# Patient Record
Sex: FEMALE | Race: OTHER | HISPANIC OR LATINO | URBAN - METROPOLITAN AREA
[De-identification: names, ages, dates, MRNs, and addresses within clinical notes are randomized per-mention and may not be internally consistent; named-entity substitution may affect disease eponyms.]

---

## 2017-11-12 ENCOUNTER — EMERGENCY (EMERGENCY)
Facility: HOSPITAL | Age: 22
LOS: 1 days | Discharge: HOME | End: 2017-11-12
Attending: EMERGENCY MEDICINE | Admitting: EMERGENCY MEDICINE
Payer: SELF-PAY

## 2017-11-12 VITALS
OXYGEN SATURATION: 100 % | HEART RATE: 90 BPM | TEMPERATURE: 98 F | SYSTOLIC BLOOD PRESSURE: 123 MMHG | RESPIRATION RATE: 16 BRPM | DIASTOLIC BLOOD PRESSURE: 80 MMHG

## 2017-11-12 LAB
APPEARANCE UR: SIGNIFICANT CHANGE UP
BACTERIA # UR AUTO: SIGNIFICANT CHANGE UP
BILIRUB UR-MCNC: NEGATIVE — SIGNIFICANT CHANGE UP
BLOOD UR QL VISUAL: NEGATIVE — SIGNIFICANT CHANGE UP
COLOR SPEC: SIGNIFICANT CHANGE UP
GLUCOSE UR-MCNC: NEGATIVE — SIGNIFICANT CHANGE UP
KETONES UR-MCNC: NEGATIVE — SIGNIFICANT CHANGE UP
LEUKOCYTE ESTERASE UR-ACNC: HIGH
MUCOUS THREADS # UR AUTO: SIGNIFICANT CHANGE UP
NITRITE UR-MCNC: NEGATIVE — SIGNIFICANT CHANGE UP
PH UR: 7 — SIGNIFICANT CHANGE UP (ref 4.6–8)
PROT UR-MCNC: 10 — SIGNIFICANT CHANGE UP
RBC CASTS # UR COMP ASSIST: SIGNIFICANT CHANGE UP (ref 0–?)
SP GR SPEC: 1.02 — SIGNIFICANT CHANGE UP (ref 1–1.03)
SQUAMOUS # UR AUTO: SIGNIFICANT CHANGE UP
UROBILINOGEN FLD QL: NORMAL E.U. — SIGNIFICANT CHANGE UP (ref 0.1–0.2)
WBC UR QL: SIGNIFICANT CHANGE UP (ref 0–?)

## 2017-11-12 PROCEDURE — 99285 EMERGENCY DEPT VISIT HI MDM: CPT

## 2017-11-12 RX ORDER — CIPROFLOXACIN LACTATE 400MG/40ML
500 VIAL (ML) INTRAVENOUS ONCE
Qty: 0 | Refills: 0 | Status: DISCONTINUED | OUTPATIENT
Start: 2017-11-12 | End: 2017-11-12

## 2017-11-12 RX ORDER — KETOROLAC TROMETHAMINE 30 MG/ML
30 SYRINGE (ML) INJECTION ONCE
Qty: 0 | Refills: 0 | Status: DISCONTINUED | OUTPATIENT
Start: 2017-11-12 | End: 2017-11-12

## 2017-11-12 RX ORDER — DIAZEPAM 5 MG
1 TABLET ORAL
Qty: 4 | Refills: 0 | OUTPATIENT
Start: 2017-11-12 | End: 2017-11-13

## 2017-11-12 RX ADMIN — Medication 30 MILLIGRAM(S): at 16:21

## 2017-11-12 NOTE — ED PROVIDER NOTE - CARE PLAN
Principal Discharge DX:	Back pain  Instructions for follow-up, activity and diet:	Follow up with your medical doctor in 2 days for re-evaluation.  Recommend following up with orthopedic spine doctor (see referral list); call for appointment.  Medications:  ibuprofen 600 mg: Take one tablet every 6 hours as needed for pain / inflammation (take with food).  diazepam 5 mg:  Take one tablet every 6 hours for muscle relaxation (may make you drowsy; do not drive on this medication).  Apply  Rest / gentle SYMMETRICAL body movements / light activity.  Minimize bending / carrying / lifting.  Return to the Emergency Department if any new or worsening symptoms or for any concerns / issues / problems. Principal Discharge DX:	Back pain  Instructions for follow-up, activity and diet:	Follow up with your medical doctor in 2 days for re-evaluation.  Recommend following up with orthopedic spine doctor (see referral list); call for appointment.  Medications:  ibuprofen 600 mg: Take one tablet every 6 hours as needed for pain / inflammation (take with food).  diazepam 5 mg:  Take one tablet every 6 hours for muscle relaxation (may make you drowsy; do not drive on this medication).  Apply Arnica gel 4 times as needed for muscle pain.  Rest / gentle SYMMETRICAL body movements / light activity.  Minimize bending / carrying / lifting.  Return to the Emergency Department if any new or worsening symptoms or for any concerns / issues / problems.

## 2017-11-12 NOTE — ED ADULT TRIAGE NOTE - CHIEF COMPLAINT QUOTE
Pt presents with c/o atraumatic lower back pain x 3 days. Describes the pain as a tightness. Increased pain with movement. Denies any other symptoms with the exception of pain. LMP: "last month".

## 2017-11-12 NOTE — ED PROVIDER NOTE - OBJECTIVE STATEMENT
23 y/o F w/ no significant PMhx presents to ED c/o worsening back pain x2days. Pt reports not mechanisms of injury. Works as a . Having difficulty moving secondary to pain. Denies incontinence, fever, chills, numbness, tingling, and no other complaints. Took an anti-inflammatory med x2days ago which made her feel lightheaded.

## 2017-11-12 NOTE — ED PROVIDER NOTE - PLAN OF CARE
Follow up with your medical doctor in 2 days for re-evaluation.  Recommend following up with orthopedic spine doctor (see referral list); call for appointment.  Medications:  ibuprofen 600 mg: Take one tablet every 6 hours as needed for pain / inflammation (take with food).  diazepam 5 mg:  Take one tablet every 6 hours for muscle relaxation (may make you drowsy; do not drive on this medication).  Apply  Rest / gentle SYMMETRICAL body movements / light activity.  Minimize bending / carrying / lifting.  Return to the Emergency Department if any new or worsening symptoms or for any concerns / issues / problems. Follow up with your medical doctor in 2 days for re-evaluation.  Recommend following up with orthopedic spine doctor (see referral list); call for appointment.  Medications:  ibuprofen 600 mg: Take one tablet every 6 hours as needed for pain / inflammation (take with food).  diazepam 5 mg:  Take one tablet every 6 hours for muscle relaxation (may make you drowsy; do not drive on this medication).  Apply Arnica gel 4 times as needed for muscle pain.  Rest / gentle SYMMETRICAL body movements / light activity.  Minimize bending / carrying / lifting.  Return to the Emergency Department if any new or worsening symptoms or for any concerns / issues / problems.

## 2017-11-12 NOTE — ED PROVIDER NOTE - MUSCULOSKELETAL MINIMAL EXAM
muscle tenderness to BL lower back, spinal exam TENDERNESS/muscle tenderness to BL lower back, spinal exam

## 2017-11-14 LAB
BACTERIA UR CULT: SIGNIFICANT CHANGE UP
SPECIMEN SOURCE: SIGNIFICANT CHANGE UP

## 2018-06-14 ENCOUNTER — TRANSCRIPTION ENCOUNTER (OUTPATIENT)
Age: 23
End: 2018-06-14

## 2018-10-29 ENCOUNTER — EMERGENCY (EMERGENCY)
Facility: HOSPITAL | Age: 23
LOS: 1 days | Discharge: HOME | End: 2018-10-29
Attending: EMERGENCY MEDICINE
Payer: SELF-PAY

## 2018-10-29 VITALS
RESPIRATION RATE: 20 BRPM | DIASTOLIC BLOOD PRESSURE: 81 MMHG | HEIGHT: 62 IN | OXYGEN SATURATION: 97 % | WEIGHT: 143.96 LBS | TEMPERATURE: 98 F | SYSTOLIC BLOOD PRESSURE: 120 MMHG | HEART RATE: 103 BPM

## 2018-10-29 LAB
APPEARANCE UR: CLEAR — SIGNIFICANT CHANGE UP
BACTERIA # UR AUTO: ABNORMAL
BILIRUB UR-MCNC: NEGATIVE — SIGNIFICANT CHANGE UP
COLOR SPEC: SIGNIFICANT CHANGE UP
DIFF PNL FLD: NEGATIVE — SIGNIFICANT CHANGE UP
EPI CELLS # UR: 4 /HPF — SIGNIFICANT CHANGE UP
GLUCOSE UR QL: NEGATIVE — SIGNIFICANT CHANGE UP
HYALINE CASTS # UR AUTO: 0 /LPF — SIGNIFICANT CHANGE UP (ref 0–2)
KETONES UR-MCNC: NEGATIVE — SIGNIFICANT CHANGE UP
LEUKOCYTE ESTERASE UR-ACNC: ABNORMAL
NITRITE UR-MCNC: NEGATIVE — SIGNIFICANT CHANGE UP
PH UR: 7 — SIGNIFICANT CHANGE UP (ref 5–8)
PROT UR-MCNC: ABNORMAL
RBC CASTS # UR COMP ASSIST: 3 /HPF — SIGNIFICANT CHANGE UP (ref 0–4)
SP GR SPEC: 1.03 — HIGH (ref 1.01–1.02)
UROBILINOGEN FLD QL: NEGATIVE — SIGNIFICANT CHANGE UP
WBC UR QL: 32 /HPF — HIGH (ref 0–5)

## 2018-10-29 PROCEDURE — 99284 EMERGENCY DEPT VISIT MOD MDM: CPT

## 2018-10-29 PROCEDURE — 81001 URINALYSIS AUTO W/SCOPE: CPT

## 2018-10-29 PROCEDURE — 87186 SC STD MICRODIL/AGAR DIL: CPT

## 2018-10-29 PROCEDURE — 87102 FUNGUS ISOLATION CULTURE: CPT

## 2018-10-29 PROCEDURE — 87086 URINE CULTURE/COLONY COUNT: CPT

## 2018-10-29 RX ORDER — FLUCONAZOLE 150 MG/1
1 TABLET ORAL
Qty: 3 | Refills: 0 | OUTPATIENT
Start: 2018-10-29

## 2018-10-29 RX ORDER — CEPHALEXIN 500 MG
1 CAPSULE ORAL
Qty: 10 | Refills: 0 | OUTPATIENT
Start: 2018-10-29 | End: 2018-11-02

## 2018-10-29 NOTE — ED PROVIDER NOTE - ATTENDING CONTRIBUTION TO CARE
Attending MD Zhu: I personally have seen and examined this patient.  Resident note reviewed and agree on plan of care and except where noted.  See below for details.     Seen in FT 4    23F with PMH of recurrent yeast infections presents to the ED with vaginal itching and suspicion for a yeast infection.  Reports that for two years has been having intermittent yeast infections that have needed both po and intra-vaginal treatment for resolution.  Reports has seen a number of Gyns to try to understand why she is getting these infections but reports etiology is still unclear.  Reports of late she has been seeing Dr Bj Parker but was unable to get an appointment to see him.  Reports that she is having vaginal itch over the last week, worsening.  Reports feels similar to her previous yeast infections.  Reports typically needs both Diflucan and Clotrimazole to resolve infection. Denies dysuria, hematuria, change in urinary habits including frequency, urgency. Denies chest pain, shortness of breath, palpitations. Denies abdominal pain, nausea, vomiting, diarrhea, blood in stools. Denies fevers, chills.  Reports sexually active with one male partner.  Denies history of STI.  On exam, NAD, head NCAT, PERRL, FROM at neck, minimal erythema to labia, no genital lesions, no tenderness to midline palpation, no stepoffs along length of spine, lungs CTAB with good inspiratory effort, +S1S2, no m/r/g, abdomen soft with +BS, NT, ND, no CVAT, pelvic exam Dr. Dawn chaperoned, +thick white discharge, no CMT, no adnexal tenderness, no blood in vaginal vault, moving all extremities with 5/5 strength bilateral upper and lower extremities, good and equal  strength bilaterally; A/P: 23F with vaginal itching and recurrent yeast infections, given repeated episodes, will giv Diflucan and Terconazole, will send UA, UrCx, UrHCG

## 2018-10-29 NOTE — ED ADULT NURSE NOTE - OBJECTIVE STATEMENT
23 year old female A&OX4 presents with vaginal discomfort x 1 week. Patient states that she has had hx of yeast infections in the past and that this feels similar. Patient endorses yellow vaginal discharge. Patient denies abdominal pain. Patient's abdomen is soft and non tender to palpation. Patient denies nausea, vomiting, dizziness, weakness, fevers, chills, burning or difficulty urinating.

## 2018-10-29 NOTE — ED PROVIDER NOTE - PROGRESS NOTE DETAILS
Attending MD Zhu: Patient re-evaluated and lab tests reviewed with patient.  Explained Rx Keflex (for UTI) and others for vaginal candidiasis.  Patient stable for discharge. Follow up instructions given, importance of follow up with Gyn emphasized, return to ED parameters reviewed and patient verbalized understanding.  All questions answered, all concerns addressed.

## 2018-10-29 NOTE — ED ADULT TRIAGE NOTE - CHIEF COMPLAINT QUOTE
"I have a yeast infection", "I get them frequently". Patient states she has "yellowish" vaginal discharge and itching.

## 2018-10-29 NOTE — ED PROVIDER NOTE - MEDICAL DECISION MAKING DETAILS
well appearing 22 yo F w/ recurrent yeast infections p/w CC vaginal itching likely 2/2 to yeast infection will send fungal swab and treat with diflucan and clotrimazole

## 2018-10-29 NOTE — ED PROVIDER NOTE - OBJECTIVE STATEMENT
22yo F pmhx recurrent yeast infections p/w CC vaginal itching x1 week. Pt. explains that she has been unable to see her GYN because he is booked for the next few weeks, she states that current symptoms feel like her usual yeast infection for which she usually is prescribed diflucan and clotrimazole cream. Pt. is sexually active with 1 partner. She denies fever chills cp sob n/v/d.

## 2018-10-29 NOTE — ED ADULT NURSE NOTE - NSIMPLEMENTINTERV_GEN_ALL_ED
Implemented All Universal Safety Interventions:  Wing to call system. Call bell, personal items and telephone within reach. Instruct patient to call for assistance. Room bathroom lighting operational. Non-slip footwear when patient is off stretcher. Physically safe environment: no spills, clutter or unnecessary equipment. Stretcher in lowest position, wheels locked, appropriate side rails in place.

## 2018-10-31 NOTE — ED POST DISCHARGE NOTE - RESULT SUMMARY
Prelim ucx results + >100,000 CFU/mL Klebsiella pneumoniae, final results/sensitivities pending Prelim ucx results + >100,000 CFU/mL Klebsiella pneumoniae, final results/sensitivities pending; genital culture testing in progress Prelim ucx results + >100,000 CFU/mL Klebsiella pneumoniae, final results/sensitivities pending; fungal genital culture testing in progress

## 2018-10-31 NOTE — ED POST DISCHARGE NOTE - OTHER COMMUNICATION
10/31/18: prelim ucx + >100,000 Klebsiella pneumoniae, sensitivities pending. Pt discharged on Keflex. Will await final sensitivities to determine need for contact vs appropriate care given. - Wiliam Martinez PA-C 10/31/18: prelim ucx + >100,000 Klebsiella pneumoniae, sensitivities pending. Pt discharged on Keflex. Additionally prelim fungal cx testing shows testing still in progress. Will await final sensitivities to determine need for contact vs appropriate care given. - Wiliam Martinez PA-C 10/31/18: prelim ucx + >100,000 Klebsiella pneumoniae, sensitivities pending. Pt discharged on Keflex. Additionally prelim fungal genital cx testing shows testing still in progress. Will await final sensitivities to determine need for contact vs appropriate care given. - Wiliam Martinez PA-C

## 2018-11-01 LAB
CULTURE RESULTS: SIGNIFICANT CHANGE UP
SPECIMEN SOURCE: SIGNIFICANT CHANGE UP

## 2019-01-10 ENCOUNTER — EMERGENCY (EMERGENCY)
Facility: HOSPITAL | Age: 24
LOS: 1 days | Discharge: HOME | End: 2019-01-10
Attending: EMERGENCY MEDICINE
Payer: COMMERCIAL

## 2019-01-10 ENCOUNTER — TRANSCRIPTION ENCOUNTER (OUTPATIENT)
Age: 24
End: 2019-01-10

## 2019-01-10 VITALS
TEMPERATURE: 98 F | OXYGEN SATURATION: 100 % | SYSTOLIC BLOOD PRESSURE: 113 MMHG | HEART RATE: 79 BPM | WEIGHT: 143.96 LBS | RESPIRATION RATE: 20 BRPM | DIASTOLIC BLOOD PRESSURE: 75 MMHG

## 2019-01-10 PROCEDURE — 93971 EXTREMITY STUDY: CPT | Mod: 26

## 2019-01-10 PROCEDURE — 93971 EXTREMITY STUDY: CPT

## 2019-01-10 PROCEDURE — 73562 X-RAY EXAM OF KNEE 3: CPT | Mod: 26,RT

## 2019-01-10 PROCEDURE — 73564 X-RAY EXAM KNEE 4 OR MORE: CPT | Mod: 26,RT

## 2019-01-10 PROCEDURE — 99284 EMERGENCY DEPT VISIT MOD MDM: CPT

## 2019-01-10 PROCEDURE — 99284 EMERGENCY DEPT VISIT MOD MDM: CPT | Mod: 25

## 2019-01-10 PROCEDURE — 73564 X-RAY EXAM KNEE 4 OR MORE: CPT

## 2019-01-10 RX ORDER — IBUPROFEN 200 MG
600 TABLET ORAL ONCE
Qty: 0 | Refills: 0 | Status: COMPLETED | OUTPATIENT
Start: 2019-01-10 | End: 2019-01-10

## 2019-01-10 RX ORDER — ACETAMINOPHEN 500 MG
650 TABLET ORAL ONCE
Qty: 0 | Refills: 0 | Status: COMPLETED | OUTPATIENT
Start: 2019-01-10 | End: 2019-01-10

## 2019-01-10 RX ADMIN — Medication 600 MILLIGRAM(S): at 12:29

## 2019-01-10 RX ADMIN — Medication 650 MILLIGRAM(S): at 12:29

## 2019-01-10 NOTE — ED ADULT NURSE NOTE - OBJECTIVE STATEMENT
24 y/o female with no other pmhx c/o pain and stiffness suddenly worsening over the past 3 days.  pt denies any recent long trips.  no vascular compromise noted.  pt is awake, alert and responsive to all stimuli.  no sob or respiratory distress.  pt awaiting md jimenez.  will continue to monitor.

## 2019-01-10 NOTE — ED PROCEDURE NOTE - PROCEDURE ADDITIONAL DETAILS
POCUS: Emergency Department Focused Ultrasound performed at patient's bedside.  The complete report will be available in PACS.   No evidence of proximal DVT.

## 2019-01-10 NOTE — ED PROVIDER NOTE - PROGRESS NOTE DETAILS
Pt feeling much better. Applied ACE wrap to good symptom relief. Told pt she could f/u with her PCP for repeat US within 1 wk. Attending note (Aram): Patient reassessed, reports improvement in pain, able to stand and walk without apparent difficulty.  US shows no evidence of proximal DVT; d/w patient importance of f/u and repeat US examination, to f/u with pcp.  Xray shows no evidence of fracture/dislocation; recommend f/u with ortho as outpatient.  Results of ED evaluation including US and Xray d/w patient, who verbalizes understanding of ED evaluation and discharge plan including medications, follow-up instructions and return precautions.

## 2019-01-10 NOTE — ED PROVIDER NOTE - NSFOLLOWUPINSTRUCTIONS_ED_ALL_ED_FT
Follow up with your PCP in 24-48 hours.   May take Tylenol and Motrin as directed on the bottle for pain control.   Return to the ER if you develop any new or worsening symptoms such as swelling, redness, chest pain, SOB, numbness, weakness, nausea, vomiting, or visual changes.

## 2019-01-10 NOTE — ED PROVIDER NOTE - NS ED ROS FT
GENERAL: No fever or chills  EYES: no change in vision  HEENT: no trouble swallowing or speaking  CARDIAC: no chest pain  PULMONARY: no cough or SOB  GI: no abdominal pain, no nausea, no vomiting, no diarrhea or constipation  : No changes in urination  SKIN: no rashes or lacerations  NEURO: no headache, numbness, tingling, weakness  MSK: +R knee pain and stiffness    ~Mohit Madison PGY1 GENERAL: No fever or chills  EYES: no change in vision  HEENT: no trouble swallowing or speaking  CARDIAC: no chest pain  PULMONARY: no cough or SOB  GI: no abdominal pain, no nausea, no vomiting, no diarrhea or constipation  : No changes in urination  SKIN: no rashes or lacerations  NEURO: no headache, numbness, tingling, weakness  MSK: +R knee pain and stiffness    ~Mohit Madison PGY1    Attending note (Aram): my review of systems is documented below:

## 2019-01-10 NOTE — ED PROVIDER NOTE - PHYSICAL EXAMINATION
Gen: AAOx3, non-toxic  Head: NCAT  HEENT: EOMI, oral mucosa moist, normal conjunctiva  Lung: CTAB, no respiratory distress, no wheezes/rhonchi/rales B/L, speaking in full sentences  CV: RRR, no murmurs, rubs or gallops  Abd: soft, NTND, no guarding, no CVA tenderness  MSK: full ROM of both knees. no visible deformities  Neuro: No focal sensory or motor deficits  Skin: Warm, well perfused, no rash, no significant swelling in the affected knee  Psych: normal affect.     ~Mohit Madison PGY1 Gen: AAOx3, non-toxic  Head: NCAT  HEENT: EOMI, oral mucosa moist, normal conjunctiva  Lung: CTAB, no respiratory distress, no wheezes/rhonchi/rales B/L, speaking in full sentences  CV: RRR, no murmurs, rubs or gallops  Abd: soft, NTND, no guarding, no CVA tenderness  MSK: full ROM of both knees. no visible deformities  Neuro: No focal sensory or motor deficits  Skin: Warm, well perfused, no rash, no significant swelling in the affected knee  Psych: normal affect.     ~Mohit Madison PGY1    Attending note (Aram): my physical exam findings are documented below:

## 2019-01-10 NOTE — ED PROVIDER NOTE - ATTENDING CONTRIBUTION TO CARE
24 y/o F  with no significant medical comorbidities c/o right knee stiffness and pain for 3 days; denies fall or other injury/trauma to the knee. States her R knee began having stiffness upon waking 3 days ago and also developed sharp pain behind the knee on bearing weight.  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  No recent immobilization/trauma/surgery, no known malignancy, no history of DVT/PE.    On Physical Exam:  General: well appearing, in NAD, speaking clearly in full sentences and without difficulty; cooperative with exam  Skin: intact, no rash; right knee has no overlying skin changes.  Extremities: no peripheral edema, no gross deformities; RLE: knee without deformity or swelling.  No point tenderness, no palpable effusion, warmth, fluctuance or crepitance.  Able to fully range joint actively.  No overlying skin changes.  Neuro: no gross neurologic deficits; sensation in right lower extremity grossly intact.  CV: dp and pt pulses intact in RLE.    AP: Atraumatic R knee pain without noted deformity or signs of infection, no palpable effusion.  Will obtain Xray to screen for bony injuries or lesions and obtain US to eval for DVT.  Ibuprofen for pain control.  If no acute findings on imaging, can be discharged with continued evaluation/management as outpatient.

## 2019-01-10 NOTE — ED PROVIDER NOTE - OBJECTIVE STATEMENT
23F no PMHx p/w atraumatic R knee stiffness and pain x 3 days. States her R knee was stiff after waking up 3 days ago. Since then she has also developed sharp pain behind the knee on bearing weight. Reports associated swelling in the posterior knee. Denies any chest pain, SOB, numbness, tingling, weakness, N/V/D, abdominal pain. Denies any recent immobilization/truama/surgery, known malignancy, history of DVT/PE.

## 2019-01-10 NOTE — ED PROVIDER NOTE - MUSCULOSKELETAL, MLM
Spine appears normal, range of motion is not limited, no muscle or joint tenderness; RLE: knee without deformity or swelling.  No point tenderness, no palpable effusion, warmth, fluctuance or crepitance.  Able to fully range joint actively.  No overlying skin changes.

## 2019-01-10 NOTE — ED PROVIDER NOTE - MEDICAL DECISION MAKING DETAILS
Atruamatic knee pain. Will r/o fracture and DVT with x-ray and US. Pain control and reassess. Atruamatic knee pain. Will r/o fracture and DVT with x-ray and US. Pain control and reassess.    Attending note (Aram): the above statement reflects the resident's MDM.  Please see my attending statement for detailed information regarding my medical decision making.

## 2019-02-01 ENCOUNTER — RESULT REVIEW (OUTPATIENT)
Age: 24
End: 2019-02-01

## 2019-03-20 ENCOUNTER — RESULT REVIEW (OUTPATIENT)
Age: 24
End: 2019-03-20

## 2020-03-06 ENCOUNTER — EMERGENCY (EMERGENCY)
Facility: HOSPITAL | Age: 25
LOS: 1 days | Discharge: HOME | End: 2020-03-06
Attending: EMERGENCY MEDICINE
Payer: COMMERCIAL

## 2020-03-06 VITALS
SYSTOLIC BLOOD PRESSURE: 115 MMHG | TEMPERATURE: 98 F | DIASTOLIC BLOOD PRESSURE: 69 MMHG | RESPIRATION RATE: 16 BRPM | OXYGEN SATURATION: 97 % | HEART RATE: 83 BPM

## 2020-03-06 VITALS
TEMPERATURE: 98 F | RESPIRATION RATE: 18 BRPM | SYSTOLIC BLOOD PRESSURE: 114 MMHG | DIASTOLIC BLOOD PRESSURE: 84 MMHG | HEIGHT: 62 IN | HEART RATE: 98 BPM | OXYGEN SATURATION: 99 % | WEIGHT: 147.05 LBS

## 2020-03-06 LAB
ALBUMIN SERPL ELPH-MCNC: 4.2 G/DL — SIGNIFICANT CHANGE UP (ref 3.3–5)
ALP SERPL-CCNC: 60 U/L — SIGNIFICANT CHANGE UP (ref 40–120)
ALT FLD-CCNC: 16 U/L — SIGNIFICANT CHANGE UP (ref 10–45)
ANION GAP SERPL CALC-SCNC: 15 MMOL/L — SIGNIFICANT CHANGE UP (ref 5–17)
APPEARANCE UR: CLEAR — SIGNIFICANT CHANGE UP
AST SERPL-CCNC: 19 U/L — SIGNIFICANT CHANGE UP (ref 10–40)
BACTERIA # UR AUTO: ABNORMAL
BASOPHILS # BLD AUTO: 0.04 K/UL — SIGNIFICANT CHANGE UP (ref 0–0.2)
BASOPHILS NFR BLD AUTO: 0.5 % — SIGNIFICANT CHANGE UP (ref 0–2)
BILIRUB SERPL-MCNC: 0.7 MG/DL — SIGNIFICANT CHANGE UP (ref 0.2–1.2)
BILIRUB UR-MCNC: NEGATIVE — SIGNIFICANT CHANGE UP
BUN SERPL-MCNC: 12 MG/DL — SIGNIFICANT CHANGE UP (ref 7–23)
CALCIUM SERPL-MCNC: 9.2 MG/DL — SIGNIFICANT CHANGE UP (ref 8.4–10.5)
CHLORIDE SERPL-SCNC: 103 MMOL/L — SIGNIFICANT CHANGE UP (ref 96–108)
CO2 SERPL-SCNC: 22 MMOL/L — SIGNIFICANT CHANGE UP (ref 22–31)
COLOR SPEC: YELLOW — SIGNIFICANT CHANGE UP
COMMENT - URINE: SIGNIFICANT CHANGE UP
CREAT SERPL-MCNC: 0.7 MG/DL — SIGNIFICANT CHANGE UP (ref 0.5–1.3)
DIFF PNL FLD: ABNORMAL
EOSINOPHIL # BLD AUTO: 0.14 K/UL — SIGNIFICANT CHANGE UP (ref 0–0.5)
EOSINOPHIL NFR BLD AUTO: 1.7 % — SIGNIFICANT CHANGE UP (ref 0–6)
EPI CELLS # UR: 5 — SIGNIFICANT CHANGE UP
GLUCOSE SERPL-MCNC: 92 MG/DL — SIGNIFICANT CHANGE UP (ref 70–99)
GLUCOSE UR QL: NEGATIVE — SIGNIFICANT CHANGE UP
HCG UR QL: NEGATIVE — SIGNIFICANT CHANGE UP
HCT VFR BLD CALC: 39.7 % — SIGNIFICANT CHANGE UP (ref 34.5–45)
HGB BLD-MCNC: 13.1 G/DL — SIGNIFICANT CHANGE UP (ref 11.5–15.5)
HYALINE CASTS # UR AUTO: 2 /LPF — SIGNIFICANT CHANGE UP (ref 0–2)
IMM GRANULOCYTES NFR BLD AUTO: 0.2 % — SIGNIFICANT CHANGE UP (ref 0–1.5)
KETONES UR-MCNC: ABNORMAL
LEUKOCYTE ESTERASE UR-ACNC: NEGATIVE — SIGNIFICANT CHANGE UP
LIDOCAIN IGE QN: 21 U/L — SIGNIFICANT CHANGE UP (ref 7–60)
LYMPHOCYTES # BLD AUTO: 1.55 K/UL — SIGNIFICANT CHANGE UP (ref 1–3.3)
LYMPHOCYTES # BLD AUTO: 19.3 % — SIGNIFICANT CHANGE UP (ref 13–44)
MCHC RBC-ENTMCNC: 29.6 PG — SIGNIFICANT CHANGE UP (ref 27–34)
MCHC RBC-ENTMCNC: 33 GM/DL — SIGNIFICANT CHANGE UP (ref 32–36)
MCV RBC AUTO: 89.8 FL — SIGNIFICANT CHANGE UP (ref 80–100)
MONOCYTES # BLD AUTO: 0.65 K/UL — SIGNIFICANT CHANGE UP (ref 0–0.9)
MONOCYTES NFR BLD AUTO: 8.1 % — SIGNIFICANT CHANGE UP (ref 2–14)
NEUTROPHILS # BLD AUTO: 5.64 K/UL — SIGNIFICANT CHANGE UP (ref 1.8–7.4)
NEUTROPHILS NFR BLD AUTO: 70.2 % — SIGNIFICANT CHANGE UP (ref 43–77)
NITRITE UR-MCNC: NEGATIVE — SIGNIFICANT CHANGE UP
NRBC # BLD: 0 /100 WBCS — SIGNIFICANT CHANGE UP (ref 0–0)
PH UR: 6 — SIGNIFICANT CHANGE UP (ref 5–8)
PLATELET # BLD AUTO: 374 K/UL — SIGNIFICANT CHANGE UP (ref 150–400)
POTASSIUM SERPL-MCNC: 4.3 MMOL/L — SIGNIFICANT CHANGE UP (ref 3.5–5.3)
POTASSIUM SERPL-SCNC: 4.3 MMOL/L — SIGNIFICANT CHANGE UP (ref 3.5–5.3)
PROT SERPL-MCNC: 7.4 G/DL — SIGNIFICANT CHANGE UP (ref 6–8.3)
PROT UR-MCNC: ABNORMAL
RBC # BLD: 4.42 M/UL — SIGNIFICANT CHANGE UP (ref 3.8–5.2)
RBC # FLD: 13.6 % — SIGNIFICANT CHANGE UP (ref 10.3–14.5)
RBC CASTS # UR COMP ASSIST: 4 /HPF — SIGNIFICANT CHANGE UP (ref 0–4)
SODIUM SERPL-SCNC: 140 MMOL/L — SIGNIFICANT CHANGE UP (ref 135–145)
SP GR SPEC: 1.03 — HIGH (ref 1.01–1.02)
UROBILINOGEN FLD QL: NEGATIVE — SIGNIFICANT CHANGE UP
WBC # BLD: 8.04 K/UL — SIGNIFICANT CHANGE UP (ref 3.8–10.5)
WBC # FLD AUTO: 8.04 K/UL — SIGNIFICANT CHANGE UP (ref 3.8–10.5)
WBC UR QL: 3 /HPF — SIGNIFICANT CHANGE UP (ref 0–5)

## 2020-03-06 PROCEDURE — 99284 EMERGENCY DEPT VISIT MOD MDM: CPT

## 2020-03-06 PROCEDURE — 71046 X-RAY EXAM CHEST 2 VIEWS: CPT | Mod: 26

## 2020-03-06 PROCEDURE — 85027 COMPLETE CBC AUTOMATED: CPT

## 2020-03-06 PROCEDURE — 83690 ASSAY OF LIPASE: CPT

## 2020-03-06 PROCEDURE — 99284 EMERGENCY DEPT VISIT MOD MDM: CPT | Mod: 25

## 2020-03-06 PROCEDURE — 81025 URINE PREGNANCY TEST: CPT

## 2020-03-06 PROCEDURE — 96374 THER/PROPH/DIAG INJ IV PUSH: CPT

## 2020-03-06 PROCEDURE — 81001 URINALYSIS AUTO W/SCOPE: CPT

## 2020-03-06 PROCEDURE — 80053 COMPREHEN METABOLIC PANEL: CPT

## 2020-03-06 PROCEDURE — 96375 TX/PRO/DX INJ NEW DRUG ADDON: CPT

## 2020-03-06 PROCEDURE — 71046 X-RAY EXAM CHEST 2 VIEWS: CPT

## 2020-03-06 RX ORDER — FAMOTIDINE 10 MG/ML
20 INJECTION INTRAVENOUS ONCE
Refills: 0 | Status: COMPLETED | OUTPATIENT
Start: 2020-03-06 | End: 2020-03-06

## 2020-03-06 RX ORDER — SODIUM CHLORIDE 9 MG/ML
1000 INJECTION INTRAMUSCULAR; INTRAVENOUS; SUBCUTANEOUS ONCE
Refills: 0 | Status: COMPLETED | OUTPATIENT
Start: 2020-03-06 | End: 2020-03-06

## 2020-03-06 RX ORDER — ONDANSETRON 8 MG/1
1 TABLET, FILM COATED ORAL
Qty: 9 | Refills: 0
Start: 2020-03-06 | End: 2020-03-08

## 2020-03-06 RX ORDER — ONDANSETRON 8 MG/1
4 TABLET, FILM COATED ORAL ONCE
Refills: 0 | Status: COMPLETED | OUTPATIENT
Start: 2020-03-06 | End: 2020-03-06

## 2020-03-06 RX ADMIN — FAMOTIDINE 20 MILLIGRAM(S): 10 INJECTION INTRAVENOUS at 15:43

## 2020-03-06 RX ADMIN — SODIUM CHLORIDE 1000 MILLILITER(S): 9 INJECTION INTRAMUSCULAR; INTRAVENOUS; SUBCUTANEOUS at 15:43

## 2020-03-06 RX ADMIN — ONDANSETRON 4 MILLIGRAM(S): 8 TABLET, FILM COATED ORAL at 15:43

## 2020-03-06 NOTE — ED ADULT TRIAGE NOTE - CHIEF COMPLAINT QUOTE
flu like s/s since yesterday with 2 episodes of vomiting prior to arrival. seen by urgent care yesterday with neg flu swab- placed on tamiflu and sudafed.

## 2020-03-06 NOTE — ED PROVIDER NOTE - NSFOLLOWUPINSTRUCTIONS_ED_ALL_ED_FT
1. Follow up with your PMD in 1-2 days.  2. Rest, stay hydrated, drink plenty of water. Recommended STOPPING Tamiflu as this may have contributed to your symptoms.   3. Take 1 tab zofran every 8 hours as needed for nausea. Take Tylenol 650 mg every 6 hours as needed for pain.   4. Return to the ED immediately if you develop any new/worsening symptoms including persistent fever, abdominal pain, chest pain, shortness of breath, weakness or any other concerning symptoms.

## 2020-03-06 NOTE — ED ADULT NURSE NOTE - OBJECTIVE STATEMENT
25 y f came to the ed c/o nausea and vomiting. patient states the symptoms started yesterday when she took her tamiflu. says she went to her doctors office who prescribed her tamiflu for management of her flu like symptoms even though she was flu negative in the office. patient is a/ox3. denies any chest pain, sob. abdomen is soft and nontender. skin is warm and dry.

## 2020-03-06 NOTE — ED PROVIDER NOTE - PATIENT PORTAL LINK FT
You can access the FollowMyHealth Patient Portal offered by [facility] by registering at the following website: [email or web address]. By joining [email or web address]’s FollowMyHealth portal, you will also be able to view your health information using other applications (apps) compatible with our system.

## 2020-03-06 NOTE — ED PROVIDER NOTE - ATTENDING CONTRIBUTION TO CARE
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 no change in bowel habits, no urinary sx. denies cp, sob, denies travel or known sick contacts.  exam notable for no distress, clear lungs, no stridors, S1-2, soft abd, non tender no organomegaly.  concern for tamilflu associated GI discomfort vs viral illness, vs less likely PNA. will do CXR, labs, IV fluids and antiacid and anti nausea. if work up reasssuring and takes PO may go home.

## 2020-03-06 NOTE — ED PROVIDER NOTE - OBJECTIVE STATEMENT
26 yo otherwise healthy female presents to the ED c/o cough, nasal congestion and vomiting. Pt states she started to "not feel well" yesterday afternoon, had nasal congestion and minimally productive cough. Went to urgent care and had negative flu swab, was given Tamiflu and Sudafed which she started last night. This morning she was feeling improved, came to work but then began have nausea and vomiting. Reports about 5 episodes of vomiting today. Has taken 2 doses of Tamiflu thus far. Denies chest pain, shortness of breath, fever/chills, recent travel, recent sick contacts, abdominal pain, urinary urgency, frequency, dysuria, fatigue, neck pain/stiffness, flank pain.

## 2020-04-25 ENCOUNTER — MESSAGE (OUTPATIENT)
Age: 25
End: 2020-04-25

## 2020-05-02 LAB
SARS-COV-2 IGG SERPL IA-ACNC: 1.1 INDEX
SARS-COV-2 IGG SERPL QL IA: NEGATIVE

## 2020-05-04 ENCOUNTER — APPOINTMENT (OUTPATIENT)
Dept: DISASTER EMERGENCY | Facility: CLINIC | Age: 25
End: 2020-05-04

## 2020-09-06 ENCOUNTER — APPOINTMENT (OUTPATIENT)
Dept: FAMILY MEDICINE | Facility: CLINIC | Age: 25
End: 2020-09-06

## 2021-01-26 NOTE — ED ADULT NURSE NOTE - CAS DISCH CONDITION
Authorized in Epic #[id number].  
Entered referral in Epic. Status changed to \"PENDING.\"    Cindi, please f/u    Thank you!  
Epic referral #[id number] approved for , , . Faxed to [phone].  
FYI  Pt is 5 5/7 A- (Pt received rhogam on 1/22 when she went to clinic for termination; will bring rhogam card to scan into chart). states she started having some dark red spotting this morning with mild cramping. States she is having mild LLQ pain. Denies any recent intercourse. Discussed with Dr. Madrigal who recommended she come in for hcg and progesterone level. Advised that she hydrate and put nothing in vagina for 2 weeks. Pt aware to go to ED if saturating pad in an hour or experiencing severe one sided pain. Pt scheduled for nurse visit today at 1pm. NOB 2/22/21.  
Lacey Puente from the [facility] office called regarding the referral for the termination, the referral is for a surgical procedure instead of medication.  Lacey requested the CPT codes , , and  be used.  Can you please update/correct the referral and fax it to [phone]?  
Left v/m that a referral will be submitted and informed pt of Dr. Harris's recommendation: Patient needs to decide on method of contraception as she refused when her Kyleena was removed in October.  She should schedule a 2-week follow-up after the termination and decide what method of contraception if she wants to use moving forward     Ngozi, can you please submit a referral for a termination of pregnancy?  
Left v/m to notify pt that an updated referral has been approved and faxed to the [facility].  Reiterated that pt should call the office to schedule a f/u appt to discuss contraception.  
Ngozi, can you do a referral for a termination of pregnancy?  
Patient calling stating that she spoke to nurse Annalisa yesterday (01/25) and Annalisa instructed pt to call the office if she began spotting, pt started spotting today  Please follow up w/ patient   Thank you  
Patient returnng call. Asked to speak with nurse today. Please call back at [phone]  
Pending in Epic.  
Pt 5 4/7 wks based on LMP 12/17/20, requested referral for a termination last week.  Pt took the first medication, mifepristone, on Saturday and then regretted her decision.  Pt did not take the second part of the pills (misoprostol).  Pt searched online for ways to reverse the mifepristone and found a local doctor who prescribed progesterone 200 mg bid x 5 days then 200 mg once daily (60 tabs prescribed).   Pt denies any bleeding or cramping.  Recommended pelvic rest, hydration, and to monitor for bleeding or cramping.  Please advise for next steps.  
Pt called and stated that the referral said it was for a surgical procedure instead of the pill form, notified the pt that the referral coordinator is not in till Monday.   
Pt called, she stated, she called a place on [address] in [place] to scheduled for  but they told her base on her ins our office should write her a referral. Please advise.  
Pt informed of referral approval for [facility].  Reminded pt to schedule a f/u visit 2 weeks after procedure to discuss birth control.   
Pt is requesting a referral for a termination of pregnancy.  Ok to do referral?  Please advise.  
Pt is requesting to have the termination at [facility] on [address] in [place].  
Pt is scheduled for her NOB appts    Task complete  
Pt notified of Dr. Harris's response.      Can one of you please call pt to schedule nob visits in about 3 weeks?  
Pt stated the [facility] did not received  the referral, she is requesting that we fax it to them. [phone]  
[facility]erral faxed to [facility] at [phone].  Pt aware.  
Yes she would need a referral. Who is referring the patient?  
Stable

## 2021-02-18 NOTE — ED PROVIDER NOTE - CHIEF COMPLAINT
2021    TELEHEALTH EVALUATION -- Audio/Visual (During ABW-65 public health emergency)    HPI:    Gertrudis Larsen (:  1984) has requested an audio/video evaluation for the following concern(s):    Congestion and headache    Respiratory Symptoms:  Patient complains of 5 day(s) history of headache and facial pain/sinus pressure: bilateral maxillary. Symptoms have been unchanged with time. She denies fever, ear symptoms, shortness of breath, wheezing/chest tightness, cough, nausea/vomiting and diarrhea. Relevant PMH: No pertinent PMH. Smoking history:  She  reports that she has never smoked. She has never used smokeless tobacco. She has had no known ill contacts. Treatment to date: OTC cough suppressant, which has been  somewhat effective. Review of Systems    Prior to Visit Medications    Medication Sig Taking? Authorizing Provider   desogestrel-ethinyl estradiol (APRI) 0.15-30 MG-MCG per tablet Take 1 tablet by mouth daily Yes Historical Provider, MD   azithromycin (ZITHROMAX) 250 MG tablet Take 1 tablet by mouth See Admin Instructions for 5 days 500mg on day 1 followed by 250mg on days 2 - 5 Yes Esme Robertson, APRN - CNP   SYNTHROID 100 MCG tablet Take 1 tablet by mouth Daily Yes Abelino Badillo MD   tacrolimus (PROTOPIC) 0.1 % ointment Apply to affected area BID Yes Win Nagel MD   ENSTILAR 0.005-0.064 % FOAM Apply to affected areas bid. Yes Win Nagel MD   Calcipotriene (SORILUX) 0.005 % FOAM Apply daily - bid to affected areas prn flares. Win Nagel MD       Social History     Tobacco Use    Smoking status: Never Smoker    Smokeless tobacco: Never Used   Substance Use Topics    Alcohol use: Yes     Alcohol/week: 1.0 standard drinks     Types: 1 Glasses of wine per week     Comment: socially    Drug use:  No            PHYSICAL EXAMINATION: The patient is a 25y Female complaining of [ INSTRUCTIONS:  \"[x]\" Indicates a positive item  \"[]\" Indicates a negative item  -- DELETE ALL ITEMS NOT EXAMINED]  Vital Signs: (As obtained by patient/caregiver or practitioner observation)    Blood pressure-  Heart rate-    Respiratory rate-    Temperature-  Pulse oximetry-     Constitutional: [x] Appears well-developed and well-nourished [x] No apparent distress      [] Abnormal-   Mental status  [x] Alert and awake  [x] Oriented to person/place/time [x]Able to follow commands      Eyes:  EOM    [x]  Normal  [] Abnormal-  Sclera  [x]  Normal  [] Abnormal -         Discharge [x]  None visible  [] Abnormal -    HENT:   [x] Normocephalic, atraumatic. [] Abnormal   [x] Mouth/Throat: Mucous membranes are moist.     External Ears [x] Normal  [] Abnormal-     Neck: [x] No visualized mass     Pulmonary/Chest: [x] Respiratory effort normal.  [x] No visualized signs of difficulty breathing or respiratory distress        [] Abnormal-       Neurological:        [x] No Facial Asymmetry (Cranial nerve 7 motor function) (limited exam to video visit)          [x] No gaze palsy        [] Abnormal-         Skin:        [x] No significant exanthematous lesions or discoloration noted on facial skin         [] Abnormal-            Psychiatric:       [x] Normal Affect [x] No Hallucinations        [] Abnormal-     Other pertinent observable physical exam findings-     ASSESSMENT/PLAN:  1. Acute bacterial sinusitis  Patient got tested for Covid yesterday which was a day 4 of her symptoms and it came back negative. Patient's only symptoms are of headache and facial pressure. I do feel it is reasonable to treat with antibiotic. Patient okay to continue over-the-counter cold medications and encouraged to increase p.o. water intake. Notify office if symptoms do not improve. The patient is a 25y Female complaining of cough/nasal congestion.

## 2021-06-15 ENCOUNTER — TRANSCRIPTION ENCOUNTER (OUTPATIENT)
Age: 26
End: 2021-06-15

## 2021-07-21 ENCOUNTER — APPOINTMENT (OUTPATIENT)
Dept: FAMILY MEDICINE | Facility: CLINIC | Age: 26
End: 2021-07-21
Payer: COMMERCIAL

## 2021-07-21 VITALS
WEIGHT: 156 LBS | HEIGHT: 62 IN | BODY MASS INDEX: 28.71 KG/M2 | DIASTOLIC BLOOD PRESSURE: 78 MMHG | SYSTOLIC BLOOD PRESSURE: 118 MMHG | OXYGEN SATURATION: 98 % | HEART RATE: 83 BPM | TEMPERATURE: 97.4 F

## 2021-07-21 DIAGNOSIS — Z83.3 FAMILY HISTORY OF DIABETES MELLITUS: ICD-10-CM

## 2021-07-21 DIAGNOSIS — Z00.00 ENCOUNTER FOR GENERAL ADULT MEDICAL EXAMINATION W/OUT ABNORMAL FINDINGS: ICD-10-CM

## 2021-07-21 DIAGNOSIS — Z82.49 FAMILY HISTORY OF ISCHEMIC HEART DISEASE AND OTHER DISEASES OF THE CIRCULATORY SYSTEM: ICD-10-CM

## 2021-07-21 DIAGNOSIS — Z86.16 PERSONAL HISTORY OF COVID-19: ICD-10-CM

## 2021-07-21 DIAGNOSIS — Z86.59 PERSONAL HISTORY OF OTHER MENTAL AND BEHAVIORAL DISORDERS: ICD-10-CM

## 2021-07-21 DIAGNOSIS — Z80.41 FAMILY HISTORY OF MALIGNANT NEOPLASM OF OVARY: ICD-10-CM

## 2021-07-21 DIAGNOSIS — Z86.69 PERSONAL HISTORY OF OTHER DISEASES OF THE NERVOUS SYSTEM AND SENSE ORGANS: ICD-10-CM

## 2021-07-21 DIAGNOSIS — G43.709 CHRONIC MIGRAINE W/OUT AURA, NOT INTRACTABLE, W/OUT STATUS MIGRAINOSUS: ICD-10-CM

## 2021-07-21 PROCEDURE — 99385 PREV VISIT NEW AGE 18-39: CPT | Mod: 25

## 2021-07-21 PROCEDURE — 36415 COLL VENOUS BLD VENIPUNCTURE: CPT

## 2021-07-21 PROCEDURE — 99072 ADDL SUPL MATRL&STAF TM PHE: CPT

## 2021-07-21 NOTE — PHYSICAL EXAM
[Normal Sclera/Conjunctiva] : normal sclera/conjunctiva [Normal Oropharynx] : the oropharynx was normal [Normal TMs] : both tympanic membranes were normal [No Edema] : there was no peripheral edema [No Extremity Clubbing/Cyanosis] : no extremity clubbing/cyanosis [Normal] : affect was normal and insight and judgment were intact [20/___] : left eye 20/[unfilled] [Snellen] : acuity screening with Snellen chart [Grossly Normal Strength/Tone] : grossly normal strength/tone [Normal Gait] : normal gait [FreeTextEntry1] : Passed whisper test; hearing WNL [de-identified] : ROM intact

## 2021-07-21 NOTE — HISTORY OF PRESENT ILLNESS
[FreeTextEntry1] : Ms. JASSO presents for annual physical.\par Needs forms for nursing school.  [de-identified] : Ms. JASSO presents for annual physical.\par Needs forms for nursing school. 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 Followed with specialist 2-3 months ago; was told everything was okay. \par Also with Hx of low vitamin D. \par Finished COVID vaccines in May 2021. \par Had COVID March of 2020.  \par \par Saw Neurology for headaches/migraines.  \par Seeing Gyn soon-hx of problems with OCPs and implant for birth control.\par Hx of anxiety-was working with therapist; doing okay currently.  No suicidal thoughts or ideations. \par Sleeps 5 hours per night; has trouble falling asleep; working schedule fluctuates.  Has coffee at 8PM. \par Medications and allergies reviewed.\par

## 2021-07-21 NOTE — HEALTH RISK ASSESSMENT
[Yes] : Yes [Monthly or less (1 pt)] : Monthly or less (1 point) [Patient reported PAP Smear was abnormal] : Patient reported PAP Smear was abnormal [HIV test declined] : HIV test declined [# of Members in Household ___] :  household currently consist of [unfilled] member(s) [With Family] : lives with family [Employed] : employed [Significant Other] : lives with significant other [Sexually Active] : sexually active [Fully functional (bathing, dressing, toileting, transferring, walking, feeding)] : Fully functional (bathing, dressing, toileting, transferring, walking, feeding) [Fully functional (using the telephone, shopping, preparing meals, housekeeping, doing laundry, using] : Fully functional and needs no help or supervision to perform IADLs (using the telephone, shopping, preparing meals, housekeeping, doing laundry, using transportation, managing medications and managing finances) [] : No [de-identified] : 1x a week walking [de-identified] : Varied diet  [Reports changes in dental health] : Reports no changes in dental health [PapSmearDate] : 01/20 [PapSmearComments] : Has gyn appt next month  [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

## 2021-07-21 NOTE — REVIEW OF SYSTEMS
[Anxiety] : anxiety [Negative] : Genitourinary [Headache] : no headache [Dizziness] : no dizziness [Suicidal] : not suicidal [Depression] : no depression [de-identified] : 5 hours of sleep per night; was seeing therapist; mood doing okay now.

## 2021-07-21 NOTE — PLAN
[FreeTextEntry1] : Will follow up labwork drawn in office today.  Ate lunch today.\par Exam within normal limits. \par \par Patient will contact [email or web address] for most recent vaccination records; knows she had boosters when she started working.  Will also get immunization records from previous physician. \par \par Needs Quant Gold-no history of positive PPD. \par \par HM-seeing Gyn this summer. Advised to try and sleep at least 7 hours per night; try to cut back on evening caffeine. Mood doing well. \par \par Will adjust meds based on labs. \par Patient expressed understanding of plan.\par

## 2021-07-22 DIAGNOSIS — R79.89 OTHER SPECIFIED ABNORMAL FINDINGS OF BLOOD CHEMISTRY: ICD-10-CM

## 2021-07-22 LAB
25(OH)D3 SERPL-MCNC: 12.2 NG/ML
ALBUMIN SERPL ELPH-MCNC: 4.5 G/DL
ALP BLD-CCNC: 66 U/L
ALT SERPL-CCNC: 19 U/L
ANION GAP SERPL CALC-SCNC: 11 MMOL/L
AST SERPL-CCNC: 15 U/L
BASOPHILS # BLD AUTO: 0.08 K/UL
BASOPHILS NFR BLD AUTO: 1 %
BILIRUB SERPL-MCNC: 0.6 MG/DL
BUN SERPL-MCNC: 11 MG/DL
CALCIUM SERPL-MCNC: 9.6 MG/DL
CHLORIDE SERPL-SCNC: 105 MMOL/L
CHOLEST SERPL-MCNC: 211 MG/DL
CO2 SERPL-SCNC: 22 MMOL/L
CREAT SERPL-MCNC: 0.78 MG/DL
EOSINOPHIL # BLD AUTO: 0.08 K/UL
EOSINOPHIL NFR BLD AUTO: 1 %
ESTIMATED AVERAGE GLUCOSE: 97 MG/DL
GLUCOSE SERPL-MCNC: 91 MG/DL
HBA1C MFR BLD HPLC: 5 %
HCT VFR BLD CALC: 40.1 %
HDLC SERPL-MCNC: 49 MG/DL
HGB BLD-MCNC: 13.5 G/DL
IMM GRANULOCYTES NFR BLD AUTO: 0.1 %
LDLC SERPL CALC-MCNC: 144 MG/DL
LYMPHOCYTES # BLD AUTO: 2.98 K/UL
LYMPHOCYTES NFR BLD AUTO: 38.3 %
MAN DIFF?: NORMAL
MCHC RBC-ENTMCNC: 30.5 PG
MCHC RBC-ENTMCNC: 33.7 GM/DL
MCV RBC AUTO: 90.5 FL
MONOCYTES # BLD AUTO: 0.42 K/UL
MONOCYTES NFR BLD AUTO: 5.4 %
NEUTROPHILS # BLD AUTO: 4.22 K/UL
NEUTROPHILS NFR BLD AUTO: 54.2 %
NONHDLC SERPL-MCNC: 162 MG/DL
PLATELET # BLD AUTO: 420 K/UL
POTASSIUM SERPL-SCNC: 4.3 MMOL/L
PROT SERPL-MCNC: 7.1 G/DL
RBC # BLD: 4.43 M/UL
RBC # FLD: 14.6 %
SODIUM SERPL-SCNC: 137 MMOL/L
TRIGL SERPL-MCNC: 87 MG/DL
TSH SERPL-ACNC: 0.56 UIU/ML
WBC # FLD AUTO: 7.79 K/UL

## 2021-07-26 LAB
M TB IFN-G BLD-IMP: NEGATIVE
QUANTIFERON TB PLUS MITOGEN MINUS NIL: 1.44 IU/ML
QUANTIFERON TB PLUS NIL: 0.02 IU/ML
QUANTIFERON TB PLUS TB1 MINUS NIL: 0.01 IU/ML
QUANTIFERON TB PLUS TB2 MINUS NIL: 0.01 IU/ML

## 2021-08-31 ENCOUNTER — RESULT REVIEW (OUTPATIENT)
Age: 26
End: 2021-08-31

## 2021-09-11 ENCOUNTER — TRANSCRIPTION ENCOUNTER (OUTPATIENT)
Age: 26
End: 2021-09-11

## 2021-11-06 ENCOUNTER — TRANSCRIPTION ENCOUNTER (OUTPATIENT)
Age: 26
End: 2021-11-06

## 2021-12-10 ENCOUNTER — TRANSCRIPTION ENCOUNTER (OUTPATIENT)
Age: 26
End: 2021-12-10

## 2022-02-26 ENCOUNTER — EMERGENCY (EMERGENCY)
Facility: HOSPITAL | Age: 27
LOS: 1 days | Discharge: HOME | End: 2022-02-26
Attending: STUDENT IN AN ORGANIZED HEALTH CARE EDUCATION/TRAINING PROGRAM
Payer: COMMERCIAL

## 2022-02-26 VITALS
HEIGHT: 62 IN | OXYGEN SATURATION: 98 % | HEART RATE: 111 BPM | DIASTOLIC BLOOD PRESSURE: 71 MMHG | WEIGHT: 154.98 LBS | TEMPERATURE: 98 F | SYSTOLIC BLOOD PRESSURE: 114 MMHG | RESPIRATION RATE: 20 BRPM

## 2022-02-26 VITALS
OXYGEN SATURATION: 100 % | RESPIRATION RATE: 20 BRPM | DIASTOLIC BLOOD PRESSURE: 70 MMHG | HEART RATE: 107 BPM | TEMPERATURE: 98 F | SYSTOLIC BLOOD PRESSURE: 108 MMHG

## 2022-02-26 LAB
ALBUMIN SERPL ELPH-MCNC: 4.4 G/DL — SIGNIFICANT CHANGE UP (ref 3.3–5)
ALP SERPL-CCNC: 63 U/L — SIGNIFICANT CHANGE UP (ref 40–120)
ALT FLD-CCNC: 20 U/L — SIGNIFICANT CHANGE UP (ref 10–45)
ANION GAP SERPL CALC-SCNC: 15 MMOL/L — SIGNIFICANT CHANGE UP (ref 5–17)
APPEARANCE UR: CLEAR — SIGNIFICANT CHANGE UP
AST SERPL-CCNC: 18 U/L — SIGNIFICANT CHANGE UP (ref 10–40)
BASOPHILS # BLD AUTO: 0.02 K/UL — SIGNIFICANT CHANGE UP (ref 0–0.2)
BASOPHILS NFR BLD AUTO: 0.2 % — SIGNIFICANT CHANGE UP (ref 0–2)
BILIRUB SERPL-MCNC: 1.2 MG/DL — SIGNIFICANT CHANGE UP (ref 0.2–1.2)
BILIRUB UR-MCNC: NEGATIVE — SIGNIFICANT CHANGE UP
BUN SERPL-MCNC: 16 MG/DL — SIGNIFICANT CHANGE UP (ref 7–23)
CALCIUM SERPL-MCNC: 9.2 MG/DL — SIGNIFICANT CHANGE UP (ref 8.4–10.5)
CHLORIDE SERPL-SCNC: 104 MMOL/L — SIGNIFICANT CHANGE UP (ref 96–108)
CO2 SERPL-SCNC: 20 MMOL/L — LOW (ref 22–31)
COLOR SPEC: SIGNIFICANT CHANGE UP
CREAT SERPL-MCNC: 0.72 MG/DL — SIGNIFICANT CHANGE UP (ref 0.5–1.3)
DIFF PNL FLD: NEGATIVE — SIGNIFICANT CHANGE UP
EOSINOPHIL # BLD AUTO: 0.04 K/UL — SIGNIFICANT CHANGE UP (ref 0–0.5)
EOSINOPHIL NFR BLD AUTO: 0.3 % — SIGNIFICANT CHANGE UP (ref 0–6)
FLUAV AG NPH QL: SIGNIFICANT CHANGE UP
FLUBV AG NPH QL: SIGNIFICANT CHANGE UP
GLUCOSE SERPL-MCNC: 101 MG/DL — HIGH (ref 70–99)
GLUCOSE UR QL: NEGATIVE — SIGNIFICANT CHANGE UP
HCG SERPL-ACNC: <2 MIU/ML — SIGNIFICANT CHANGE UP
HCT VFR BLD CALC: 39.6 % — SIGNIFICANT CHANGE UP (ref 34.5–45)
HGB BLD-MCNC: 13.8 G/DL — SIGNIFICANT CHANGE UP (ref 11.5–15.5)
IMM GRANULOCYTES NFR BLD AUTO: 0.5 % — SIGNIFICANT CHANGE UP (ref 0–1.5)
KETONES UR-MCNC: SIGNIFICANT CHANGE UP
LEUKOCYTE ESTERASE UR-ACNC: NEGATIVE — SIGNIFICANT CHANGE UP
LIDOCAIN IGE QN: 25 U/L — SIGNIFICANT CHANGE UP (ref 7–60)
LYMPHOCYTES # BLD AUTO: 0.72 K/UL — LOW (ref 1–3.3)
LYMPHOCYTES # BLD AUTO: 6 % — LOW (ref 13–44)
MCHC RBC-ENTMCNC: 29.9 PG — SIGNIFICANT CHANGE UP (ref 27–34)
MCHC RBC-ENTMCNC: 34.8 GM/DL — SIGNIFICANT CHANGE UP (ref 32–36)
MCV RBC AUTO: 85.9 FL — SIGNIFICANT CHANGE UP (ref 80–100)
MONOCYTES # BLD AUTO: 0.37 K/UL — SIGNIFICANT CHANGE UP (ref 0–0.9)
MONOCYTES NFR BLD AUTO: 3.1 % — SIGNIFICANT CHANGE UP (ref 2–14)
NEUTROPHILS # BLD AUTO: 10.81 K/UL — HIGH (ref 1.8–7.4)
NEUTROPHILS NFR BLD AUTO: 89.9 % — HIGH (ref 43–77)
NITRITE UR-MCNC: NEGATIVE — SIGNIFICANT CHANGE UP
NRBC # BLD: 0 /100 WBCS — SIGNIFICANT CHANGE UP (ref 0–0)
PH UR: 7 — SIGNIFICANT CHANGE UP (ref 5–8)
PLATELET # BLD AUTO: 357 K/UL — SIGNIFICANT CHANGE UP (ref 150–400)
POTASSIUM SERPL-MCNC: 4.3 MMOL/L — SIGNIFICANT CHANGE UP (ref 3.5–5.3)
POTASSIUM SERPL-SCNC: 4.3 MMOL/L — SIGNIFICANT CHANGE UP (ref 3.5–5.3)
PROT SERPL-MCNC: 7.3 G/DL — SIGNIFICANT CHANGE UP (ref 6–8.3)
PROT UR-MCNC: SIGNIFICANT CHANGE UP
RBC # BLD: 4.61 M/UL — SIGNIFICANT CHANGE UP (ref 3.8–5.2)
RBC # FLD: 14.3 % — SIGNIFICANT CHANGE UP (ref 10.3–14.5)
RSV RNA NPH QL NAA+NON-PROBE: SIGNIFICANT CHANGE UP
SARS-COV-2 RNA SPEC QL NAA+PROBE: DETECTED
SODIUM SERPL-SCNC: 139 MMOL/L — SIGNIFICANT CHANGE UP (ref 135–145)
SP GR SPEC: 1.04 — HIGH (ref 1.01–1.02)
UROBILINOGEN FLD QL: NEGATIVE — SIGNIFICANT CHANGE UP
WBC # BLD: 12.02 K/UL — HIGH (ref 3.8–10.5)
WBC # FLD AUTO: 12.02 K/UL — HIGH (ref 3.8–10.5)

## 2022-02-26 PROCEDURE — 96375 TX/PRO/DX INJ NEW DRUG ADDON: CPT

## 2022-02-26 PROCEDURE — 93010 ELECTROCARDIOGRAM REPORT: CPT

## 2022-02-26 PROCEDURE — 99285 EMERGENCY DEPT VISIT HI MDM: CPT | Mod: 25

## 2022-02-26 PROCEDURE — 87637 SARSCOV2&INF A&B&RSV AMP PRB: CPT

## 2022-02-26 PROCEDURE — 85025 COMPLETE CBC W/AUTO DIFF WBC: CPT

## 2022-02-26 PROCEDURE — 74177 CT ABD & PELVIS W/CONTRAST: CPT | Mod: MA

## 2022-02-26 PROCEDURE — 36415 COLL VENOUS BLD VENIPUNCTURE: CPT

## 2022-02-26 PROCEDURE — 71045 X-RAY EXAM CHEST 1 VIEW: CPT

## 2022-02-26 PROCEDURE — 99284 EMERGENCY DEPT VISIT MOD MDM: CPT

## 2022-02-26 PROCEDURE — 96374 THER/PROPH/DIAG INJ IV PUSH: CPT | Mod: XU

## 2022-02-26 PROCEDURE — 74177 CT ABD & PELVIS W/CONTRAST: CPT | Mod: 26,MA

## 2022-02-26 PROCEDURE — 84702 CHORIONIC GONADOTROPIN TEST: CPT

## 2022-02-26 PROCEDURE — 80053 COMPREHEN METABOLIC PANEL: CPT

## 2022-02-26 PROCEDURE — 93005 ELECTROCARDIOGRAM TRACING: CPT

## 2022-02-26 PROCEDURE — 71045 X-RAY EXAM CHEST 1 VIEW: CPT | Mod: 26

## 2022-02-26 PROCEDURE — 83690 ASSAY OF LIPASE: CPT

## 2022-02-26 PROCEDURE — 81003 URINALYSIS AUTO W/O SCOPE: CPT

## 2022-02-26 RX ORDER — ACETAMINOPHEN 500 MG
975 TABLET ORAL ONCE
Refills: 0 | Status: COMPLETED | OUTPATIENT
Start: 2022-02-26 | End: 2022-02-26

## 2022-02-26 RX ORDER — SODIUM CHLORIDE 9 MG/ML
1000 INJECTION INTRAMUSCULAR; INTRAVENOUS; SUBCUTANEOUS
Refills: 0 | Status: DISCONTINUED | OUTPATIENT
Start: 2022-02-26 | End: 2022-03-02

## 2022-02-26 RX ORDER — ONDANSETRON 8 MG/1
4 TABLET, FILM COATED ORAL ONCE
Refills: 0 | Status: COMPLETED | OUTPATIENT
Start: 2022-02-26 | End: 2022-02-26

## 2022-02-26 RX ORDER — SODIUM CHLORIDE 9 MG/ML
1000 INJECTION INTRAMUSCULAR; INTRAVENOUS; SUBCUTANEOUS ONCE
Refills: 0 | Status: COMPLETED | OUTPATIENT
Start: 2022-02-26 | End: 2022-02-26

## 2022-02-26 RX ORDER — SODIUM CHLORIDE 9 MG/ML
1000 INJECTION, SOLUTION INTRAVENOUS ONCE
Refills: 0 | Status: COMPLETED | OUTPATIENT
Start: 2022-02-26 | End: 2022-02-26

## 2022-02-26 RX ORDER — METOCLOPRAMIDE HCL 10 MG
10 TABLET ORAL ONCE
Refills: 0 | Status: COMPLETED | OUTPATIENT
Start: 2022-02-26 | End: 2022-02-26

## 2022-02-26 RX ADMIN — ONDANSETRON 4 MILLIGRAM(S): 8 TABLET, FILM COATED ORAL at 17:55

## 2022-02-26 RX ADMIN — SODIUM CHLORIDE 1000 MILLILITER(S): 9 INJECTION INTRAMUSCULAR; INTRAVENOUS; SUBCUTANEOUS at 12:54

## 2022-02-26 RX ADMIN — SODIUM CHLORIDE 1000 MILLILITER(S): 9 INJECTION, SOLUTION INTRAVENOUS at 17:55

## 2022-02-26 RX ADMIN — Medication 975 MILLIGRAM(S): at 23:31

## 2022-02-26 RX ADMIN — Medication 10 MILLIGRAM(S): at 21:16

## 2022-02-26 RX ADMIN — Medication 975 MILLIGRAM(S): at 21:15

## 2022-02-26 NOTE — ED ADULT NURSE NOTE - OBJECTIVE STATEMENT
Patient is a 26 y/o female with no significant PMH presenting to the ED with c/o n/v and abdominal pain starting around 5AM today. Patient states she woke up and has vomited 7 or 8 times since and has not been able to hold down any food or drinks. Patient is a 28 y/o female with no significant PMH presenting to the ED with c/o n/v and abdominal pain starting around 5AM today. Patient states she woke up and has vomited 7 or 8 times since and has not been able to hold down any food or drinks. Patient reports eating Chinese takeout from a place she has never ordered from yesterday evening. A&Ox4, breathing unlabored, chest rise equal/symmetrical, reports SOB on exertion, reports chest pain and back pain radiating from her upper abdomen, abdomen nondistended, tender upon palpation, denies diarrhea, radial pulses strong bilaterally, moving all extremities w/o difficulty, skin warm, dry, intact, denies fever, cough, or chills.

## 2022-02-26 NOTE — ED ADULT NURSE REASSESSMENT NOTE - NS ED NURSE REASSESS COMMENT FT1
Patient HR elevated to 170s, HR now in 120s, EKG to be obtained, MD and RN at bedside, patient states "I couldn't keep the crackers down I threw them up and I am anxious because I have an exam to take Tuesday," anti nausea meds to be ordered by MD, cardiac monitoring maintained, will continue to monitor HR, other VS stable, comfort and safety maintained.

## 2022-02-26 NOTE — ED PROVIDER NOTE - NSFOLLOWUPINSTRUCTIONS_ED_ALL_ED_FT
Abdominal pain is a very common reason for people to visit the emergency room. Frequently the exact cause of these painful symptoms is not diagnosed in the ED. Very often, your emergency provider will focus on ruling out a dangerous diagnosis and trying to help you feel better. Luckily most abdominal pain gets better with time and rest. In rare circumstances, abdominal pain can be a sign of a much more severe medical condition.     HOME CARE INSTRUCTIONS   - rest  - drink plenty of non-caffeinated non-alcoholic fluids. Keep in mind salt restrictions if you are eating store bought soups  - avoid excessive motrin / Advil / ibuprofen (NSAID's) as these can make abdominal pain worse. Tylenol is a good choice for pain (make sure that you follow dosage guidelines)  - avoid fatty or spicy foods  - make an appointment to see your doctor  - If you have vomiting or diarrhea - DO NOT go to work while you have these contagious symptoms     RETURN TO THE EMERGENCY ROOM RIGHT AWAY  - IF YOUR PAIN CHANGES OR GETS WORSE  - IF YOU HAVE NAUSEA AND VOMITING THAT PERSISTS AND YOU CANNOT KEEP DOWN SOLIDS OR LIQUIDS  - YOUR PAIN RADIATES TO YOUR BACK  - YOU HAVE CHEST PAIN, SHORTNESS OF BREATH OR DIZZINESS  - IF YOU FEEL DIZZY OR WEAK  - IF YOU HAVE CHEST PAIN  - YOU HAVE BLEEDING FROM YOUR RECTUM OR DARK OR TARRY/STICKY STOOLS  - YOU HAVE BLEEDING FROM YOUR PENIS OR VAGINA OR BLOOD IN YOUR URINE  - YOUR PAIN IS NOT IMPROVING IN 24 HOURS  - YOU FEEL SICK OR HAVE CONCERNS      What is a coronavirus?  Coronaviruses are a large family of viruses that cause illnesses ranging from the common cold to more severe diseases such as Middle East Respiratory Syndrome (MERS) and Severe Acute Respiratory Syndrome (SARS).    What is Novel Coronavirus (COVID-19)?  The Centers for Disease Control and Prevention (CDC) is closely monitoring the outbreak caused by COVID-19. For the latest information about COVID-19, visit the CDC website at CDC.gov/Coronavirus    How are coronaviruses spread?  Coronaviruses can be transmitted from person to person, usually after close contact with an infected  person (for example, in a household, workplace, or healthcare setting), via droplets that become airborne after a cough or sneeze. These droplets can then infect a nearby person. Transmission can also occur by touching recently contaminated surfaces.    Is there a treatment for a COVID-19?  There is no specific treatment for disease caused by COVID-19. However, many of the symptoms can be treated based on the patient’s clinical condition. Supportive care for infected persons can be highly effective.    What are the symptoms of coronavirus infection?  It depends on the virus, but common signs include fever and/or respiratory symptoms such as cough and shortness of breath. In more severe cases, infection can cause pneumonia, severe acute respiratory syndrome, kidney failure and even death. Fortunately, most cases of COVID-19 have an illness no different than the influenza (flu), with a majority of these patients having mild symptoms and overall mortality which appears to be not much different than the flu.    What can I do to protect myself?  The best precautionary measures:  – washing your hands  – covering your cough  – disinfecting surfaces  – it is also advisable to avoid close contact with anyone showing symptoms of respiratory illness such as coughing and sneezing  – those with symptoms should wear a surgical mask when around others    What can I do to protect those around me?  If you have been identified as someone who may be infected with COVID-19, we recommend you follow the self-isolation procedures outlined on the following page to protect those around you and to limit the spread of this virus.    We recommend the below precautionary steps from now until 14 days from when you returned from your travel or date of your last known possible contact:    — Do not go to work, school or public areas. Avoid using public transportation, ridesharing or taxis.  — As much as possible, separate yourself from other people in your home. If you can, you should stay in a room and away from other people. Also, you should use a separate bathroom if available.  — Wear the supplied mask whenever you are around other people.  — If you have a non-urgent medical appointment, please reschedule for a later date. If the appointment is urgent, please call the health care provider and tell them that you are on self-isolation for possible COVID-19. This will help the health care provider’s office take steps to keep other people from getting infected or exposed. If you can reschedule routine appointments, do so.  — Wash your hands often with soap and water for at least 15 to 20 seconds or clean your hands with an alcohol-based hand  that contains 60 to 95% alcohol, covering all surfaces of your hands and rubbing them together until they feel dry. Soap and water should be used preferentially if hands are visibly dirty.  — Cover your mouth and nose with a tissue when you cough or sneeze. Throw used tissues in a lined trash can. Immediately wash your hands.  — Avoid touching your eyes, nose, and mouth with your hands.  — Avoid sharing personal household items. You should not share dishes, drinking glasses, cups, eating utensils, towels, or bedding with other people or pets in your home. After using these items, they should be washed thoroughly with soap and water.  — Clean and disinfect all “high-touch” surfaces every day. High touch surfaces include counters, tabletops, doorknobs, light switches, remote controls, bathroom fixtures, toilets, phones, keyboards, tablets, and bedside tables. Also, clean any surfaces that may have blood, stool, or body fluids on them. Please return to the ED for any concerns, including worsening shortness of breath, chest pain, inability to tolerate oral intake, or any other concerns.    Please follow-up with your primary care doctor in the next 3 days.     Please return to the ED if you cant tolerate oral intake for the next day.

## 2022-02-26 NOTE — ED CDU PROVIDER INITIAL DAY NOTE - OBJECTIVE STATEMENT
26yo F hx of migraines comes to ED for vomiting. Pt this morning w/ nausea and vomiting, mild upper abdominal pain, no surgical hx. LMP last month due for it this week, no vaginal bleeding or discharge. Normal urination. States that after she vomited this morning she felt chest pain and SOB, but resolved shortly afterwards. Denies current fever, cp, sob, change in urine or bowel. Had chinese food yesterday, no sick contacts. Vaccinated against COVID. Family at home without similar symptoms. Tried marijuana once, denies frequent use.   ED course: CT showed "Normal appendix. No bowel obstruction. Diffuse hepatic steatosis and enlarged left lobe of the liver." COVID+. Unable to tolerate PO. Temp 100.4F. Plan to continue hydration and PO challenge in CDU.

## 2022-02-26 NOTE — ED PROVIDER NOTE - OBJECTIVE STATEMENT
28yo F hx of migraines comes to ED for vomiting. Pt this morning w/ nausea and vomiting, mild upper abdominal pain, no surgical hx. LMP last month due for it this week, no bleeding or discharge. Normal urination. Denies fever, cp, sob, change in urine or bowel. Had chinese food yesterday, no sick contacts. Vaccinated against COVID.

## 2022-02-26 NOTE — ED PROVIDER NOTE - PHYSICAL EXAMINATION
General: NAD  HEENT: NCAT, PERRL  Cardiac: RRR, no murmurs, 2+ peripheral pulses  Chest: CTA  Abdomen: soft, non-distended, bowel sounds present, no ttp, no rebound or guarding  Extremities: no peripheral edema, calf tenderness, or leg size discrepancies  Skin: no rashes  Neuro: AAOx3, motor and sensory grossly intact  Psych: mood and affect appropriate

## 2022-02-26 NOTE — ED PROVIDER NOTE - PROGRESS NOTE DETAILS
pt found to be covid positive, ct scan pending likely gastric sx related to acute covid 19 infection Attending MD Golden : Patient signed out to me by Dr Sanchez pending CT. CT nonactonable. Paitent was about to be Po challenge, continued to vomit. Will give fluids, zofran and reassess. Attending MD Golden : Pt persistently vomiting. WIll CDU for persistent N/V likely from COVID gastroenteritis. I evaluated patient for CDU. Pt accepted for hydration and PO challenge. I ordered Tylenol (temp 100.4F), chest xray, and IVF. When patient brought over to CDU stated that she wanted to go home. Dr. Golden made aware and patient brought back to [facility]. - Noreen Contreras PA-C Attending MD Golden : Patient reevaluated, wants to go home, will d/c. Attending MD Golden : Patient reevaluated, wants to go home, will d/c. HR when resting low 90s. No SOB, no CP. Doubt PE. Patient did not PO challenge here, but would prefer to go home and see how she feels tomorrow. Given strict discharge instructions and return precautions.

## 2022-02-26 NOTE — ED PROVIDER NOTE - PATIENT PORTAL LINK FT
You can access the FollowMyHealth Patient Portal offered by  by registering at the following website: [email or web address]. By joining [facility]’s FollowMyHealth portal, you will also be able to view your health information using other applications (apps) compatible with our system.

## 2022-02-26 NOTE — ED CDU PROVIDER INITIAL DAY NOTE - PHYSICAL EXAMINATION
GEN: Well Appearing, Nontoxic, NAD  HEENT: NC/AT, Symm Facies.   CV: No JVD/Bruits or stridor;  +S1S2, RRR w/o m/g/r  RESP: CTAB w/o w/r/r  ABD: Soft, nt/nd, +BS. No guarding/rebound. No RUQ tender, no CVAT  EXT/MSK: No lower extremity edema or calf tenderness.  FROMx4  Neuro: Grossly intact  Psych: Appropriate mood and affect

## 2022-02-26 NOTE — ED CDU PROVIDER INITIAL DAY NOTE - DETAILS
N/V/COVID+  -IVF, antiemetics, PO challenge   -DW Chantel, vitals every 4 hours, frequent reevaluations

## 2022-02-26 NOTE — ED PROVIDER NOTE - CLINICAL SUMMARY MEDICAL DECISION MAKING FREE TEXT BOX
ap- 27 F no med hx here w/ n/v x6 episodes started this AM when she woke up recent ingestion of chinese food last night. no body aches, no fevers, no chills, no runny nose no cough, no diarrhea. vomit nonbloody, mild abd pain worse on the r side, pt w/ clear lungs soft abodmen w/ mild RLQ tenderness, no vaginal complaints. Plan for labs ct and reassessment, dispo pending results

## 2022-02-26 NOTE — ED ADULT NURSE REASSESSMENT NOTE - NS ED NURSE REASSESS COMMENT FT1
Patient resting, VSS, breathing unlabored, snacks and water provided, side rails raised, comfort and safety maintained.

## 2022-02-26 NOTE — ED ADULT NURSE NOTE - CAS EDN INTEG ASSESS
Thyroid well controlled. Sent in years worth of refills.     Please let them know.     Thank you,  Pedrito Toribio, DO     - - -

## 2022-02-26 NOTE — ED CDU PROVIDER INITIAL DAY NOTE - NS ED ROS FT
Constitutional: No fever or chills  Eyes: No visual changes, eye pain   CV: + chest pain after vomiting, resolved. No lower extremity edema  Resp: + SOB after vomiting, resolved. No cough  GI: + abd pain. + nausea + vomiting. No diarrhea.   : No dysuria, hematuria.   MSK: No musculoskeletal pain  Skin: No rash  Psych: No complaints   Neuro: No headache. No numbness or tingling.   Endo: No DM

## 2022-02-26 NOTE — ED PROVIDER NOTE - ATTENDING CONTRIBUTION TO CARE
27 F here w/ n/v started today, yesterday ate chinese food, since then pt reports she slept woke up w/ n/v no diarrhea, no blood occurred 6 episodes. no vaginal bleeding no vaginal discharge, no prior abd surgeries, no allergies On exam, pt awake and alert in no distress, +rlq tenderness, no cva tenderenss, clear lungs, plan for labs imaging to assess for possible appendicitis, urinary tract infeciton 27 F here w/ n/v started today, yesterday ate chinese food, since then pt reports she slept woke up w/ n/v no diarrhea, no blood occurred 6 episodes. no vaginal bleeding no vaginal discharge, no prior abd surgeries, no allergies On exam, pt awake and alert in no distress, +mild rlq tenderness, no cva tenderenss, clear lungs, plan for labs imaging to assess for possible appendicitis, urinary tract infection

## 2022-02-27 RX ORDER — ONDANSETRON 8 MG/1
1 TABLET, FILM COATED ORAL
Qty: 15 | Refills: 0
Start: 2022-02-27 | End: 2022-03-03

## 2022-06-14 ENCOUNTER — EMERGENCY (EMERGENCY)
Facility: HOSPITAL | Age: 27
LOS: 1 days | Discharge: HOME | End: 2022-06-14
Attending: EMERGENCY MEDICINE
Payer: COMMERCIAL

## 2022-06-14 VITALS — OXYGEN SATURATION: 98 % | HEART RATE: 91 BPM | RESPIRATION RATE: 18 BRPM

## 2022-06-14 VITALS
OXYGEN SATURATION: 98 % | WEIGHT: 154.98 LBS | SYSTOLIC BLOOD PRESSURE: 117 MMHG | HEIGHT: 62 IN | HEART RATE: 111 BPM | TEMPERATURE: 98 F | RESPIRATION RATE: 18 BRPM | DIASTOLIC BLOOD PRESSURE: 88 MMHG

## 2022-06-14 PROCEDURE — 99283 EMERGENCY DEPT VISIT LOW MDM: CPT

## 2022-06-14 PROCEDURE — 99284 EMERGENCY DEPT VISIT MOD MDM: CPT

## 2022-06-14 RX ORDER — IBUPROFEN 200 MG
600 TABLET ORAL ONCE
Refills: 0 | Status: COMPLETED | OUTPATIENT
Start: 2022-06-14 | End: 2022-06-14

## 2022-06-14 RX ORDER — DIAZEPAM 5 MG
1 TABLET ORAL
Qty: 5 | Refills: 0
Start: 2022-06-14 | End: 2022-06-15

## 2022-06-14 RX ORDER — ACETAMINOPHEN 500 MG
975 TABLET ORAL ONCE
Refills: 0 | Status: COMPLETED | OUTPATIENT
Start: 2022-06-14 | End: 2022-06-14

## 2022-06-14 RX ORDER — LIDOCAINE 4 G/100G
1 CREAM TOPICAL ONCE
Refills: 0 | Status: COMPLETED | OUTPATIENT
Start: 2022-06-14 | End: 2022-06-14

## 2022-06-14 RX ADMIN — LIDOCAINE 1 PATCH: 4 CREAM TOPICAL at 22:11

## 2022-06-14 RX ADMIN — Medication 975 MILLIGRAM(S): at 22:09

## 2022-06-14 RX ADMIN — Medication 600 MILLIGRAM(S): at 22:09

## 2022-06-14 NOTE — ED PROVIDER NOTE - OBJECTIVE STATEMENT
27 F no significant pmhx presents c/o R lower back pain after trying to move a heavy object. Pt reports moving a heavy surgical instrument, was transporting the object and performed a twisting motion and began feeling gradually worsening R lumbar back pain. Pt reports the pain is tightness, worse with movement, no radiation, no pain meds taken PTA. Pt denies f/c, IVDA, falls/trauma, numbness, paresthesias, weakness, abdominal pain, n/v/d, dysuria, hematuria, change in stools, urinary or fecal retention/incontinence, saddle anesthesia.

## 2022-06-14 NOTE — ED PROVIDER NOTE - NSFOLLOWUPCLINICS_GEN_ALL_ED_FT
[facility]  Sports Medicine  [address] [place] [address]  Phone: [phone]  Fax:

## 2022-06-14 NOTE — ED PROVIDER NOTE - NSFOLLOWUPINSTRUCTIONS_ED_ALL_ED_FT
1) Follow-up with your PCP in 2-3 days.      Call the spine center at [phone] for a Follow-up within 1 week      Bring all printed results to discuss with your provider.    2) Avoid strenuous activity but do not strictly rest in bed. Move around throughout the day to prevent the back from becoming more stiff. Use warm compresses.    3) Pain can be managed with Tylenol 1000mg (2 extra strength tablets) every 8 hours and/or ibuprofen  600mg (3 regular strength tablets) every 8 hours. Apply an over the counter lidocaine patch to the area, use as directed. Use Valium 5mg as directed for back spasms. DO NOT drive, operate machinery, or drink alcohol when taking Valium.    4) Continue to take all other medications as directed.    5) Return to the emergency room immediately if your symptoms worsen or persist, fever, new or worsening pain in the affected area, difficulty walking, weight loss, redness of the back, abdominal pain, vomiting, incontinence (pooping or peeing yourself), unusual numbness, tingling, weakness, or if any other concerning or questionable symptoms. 1) Follow-up with your PCP in 2-3 days.      Call the spine center at [phone] [facility]  OR the sports medicine clinic for a Follow-up within 1 week      Bring all printed results to discuss with your provider.    2) Avoid strenuous activity but do not strictly rest in bed. Move around throughout the day to prevent the back from becoming more stiff. Use warm compresses.    3) Pain can be managed with Tylenol 1000mg (2 extra strength tablets) every 8 hours and/or ibuprofen  600mg (3 regular strength tablets) every 8 hours. Apply an over the counter lidocaine patch to the area, use as directed. Use Valium 5mg as directed for back spasms. DO NOT drive, operate machinery, or drink alcohol when taking Valium.    4) Continue to take all other medications as directed.    5) Return to the emergency room immediately if your symptoms worsen or persist, fever, new or worsening pain in the affected area, difficulty walking, weight loss, redness of the back, abdominal pain, vomiting, incontinence (pooping or peeing yourself), unusual numbness, tingling, weakness, or if any other concerning or questionable symptoms.

## 2022-06-14 NOTE — ED PROVIDER NOTE - NS ED ATTENDING STATEMENT MOD
This was a shared visit with the GABRIELA. I reviewed and verified the documentation and independently performed the documented:

## 2022-06-14 NOTE — ED PROVIDER NOTE - CLINICAL SUMMARY MEDICAL DECISION MAKING FREE TEXT BOX
27 F p/w L lumbar pain after moving a heavy object. ttp of paraspinal muscles in R lumbar region with palpable spasm, no focal neuro deficits. Most likely lumbar muscle strain, less likely referred pain/radiculopathy from herniated disc. No red flag sxs or physical exam findings for back pain to suggest malignancy, epidural abscess, cauda equina syndrome, or other ortho/neurosurgical emergency. No concern for vascular pathology including aortic dissection and high grade PAD. No concern for urgent/emergent  pathology, renal calculi, ascending UTI, retroperitoneal infection. Plan for pain control and reassess with likely DC, f/u PCP, and spine info. -Cassius Young PA-C

## 2022-06-14 NOTE — ED PROVIDER NOTE - PHYSICAL EXAMINATION
GEN: Pt in NAD, non-toxic.  PSYCH: Affect appropriate.  EYES: Sclera white w/o injection.   ENT: Head NCAT. Nose w/o deformity. No auricular TTP. MMM. Neck supple FROM.   RESP: No chest wall tenderness, CTA b/l, no wheezes, rales, or rhonchi.   CARDIAC: RRR, clear distinct S1, S2, no appreciable murmurs.  ABD: Abdomen soft, non-tender. No CVAT b/l.  VASC: 2+ dorsalis pedis pulses b/l. No edema or calf tenderness.  MSK: No joint erythema or obvious deformity. No obvious spinal deformity, no midline spinal ttp, no step-offs. +ttp of musculature in R lumbar region with palpable spasm. FROM b/l LE. Pain reproduced with forward flexion and twisting of the trunk.  NEURO: Normal and equal sensation and 5/5 strength of LE b/l. Steady gait and normal gross cerebellar function. Negative Straight leg raise and cross leg raise b/l.  SKIN: No rashes on the trunk.

## 2022-06-14 NOTE — ED PROVIDER NOTE - ATTENDING APP SHARED VISIT CONTRIBUTION OF CARE
Attending MD Zhu:   I personally have seen and examined this patient.  Physician assistant note reviewed and agree on plan of care and except where noted.  See below for details.     seen in [place]    27F with PMH/PSH including palatal cancer no known drug allergies presents to the ED with R lower back pain s/p moving heavy object.  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  Reports was moving something and did a twisting motion.  Reports felt sudden pain and then had gradual worsening of that pain. Reports feels tightness, worse with movement, especially twisting.  Denies LE pain, denies radiation. Denies numbness, weakness or tingling in extremities. Denies loss of urinary or bowel continence. Denies dysuria, hematuria, change in urinary habits including frequency, urgency.  Denies chest pain, shortness of breath, abdominal pain, nausea, vomiting, diarrhea.  Denies fevers chills.  Denies chronic steroid use, epidural injections, AC use, prior spinal surgery, AAA. Attending MD Zhu:   I personally have seen and examined this patient.  Physician assistant note reviewed and agree on plan of care and except where noted.  See below for details.     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  Reports was moving something and did a twisting motion.  Reports felt sudden pain and then had gradual worsening of that pain. Reports feels tightness, worse with movement, especially twisting.  Denies LE pain, denies radiation. Denies numbness, weakness or tingling in extremities. Denies loss of urinary or bowel continence. Denies dysuria, hematuria, change in urinary habits including frequency, urgency.  Denies chest pain, shortness of breath, abdominal pain, nausea, vomiting, diarrhea, weight loss, bloody or black stools.  Denies fevers chills.  Denies chronic steroid use, epidural injections, AC use, prior spinal surgery, IVDU, AAA.    Exam:   General: NAD  HENT: head NCAT, airway patent   Chest: symmetric chest rise, no increased work of breathing  MSK: ranging neck freely, no midline tenderness to palpation of spine, no stepoffs, +tenderness to palpation to R paralumbar spine, +muscle spasm  Neuro: moving all extremities spontaneously with 5/5 strength, no saddle anesthesia, sensory grossly intact, no gross neuro deficits  Psych: normal mood and affect     A/P: 27F with R lower back pain, suspect MSK, history and clinical picture without red flags, will give pain control, reassess

## 2022-06-14 NOTE — ED PROVIDER NOTE - PATIENT PORTAL LINK FT
You can access the FollowMyHealth Patient Portal offered by [facility] by registering at the following website: [email or web address]. By joining [employer]’s FollowMyHealth portal, you will also be able to view your health information using other applications (apps) compatible with our system.

## 2022-06-14 NOTE — ED PROVIDER NOTE - PROGRESS NOTE DETAILS
Attending MD Zhu: Patient re-evaluated and feeling improved.  No acute issues at  this time.  Patient stable for discharge. Follow up instructions given, importance of follow up emphasized, return to ED parameters reviewed and patient verbalized understanding.  All questions answered, all concerns addressed.

## 2022-08-13 ENCOUNTER — APPOINTMENT (OUTPATIENT)
Dept: MRI IMAGING | Facility: IMAGING CENTER | Age: 27
End: 2022-08-13

## 2022-09-14 NOTE — ED PROVIDER NOTE - NS ED NOTE AC HIGH RISK COUNTRIES
09/14/22                            Roberta Rocha  [address]  [id number] [facility] [id number]    To Whom It May Concern:    This is to certify Roberta Rocha was evaluated with Sharath Caban MD on 09/14/22 and tested positive for Covid.  She will need to be excused from work until at least 9/19/22, however depending on her clinical course her period of quarantine could be longer.             Sharath Caban MD  [facility]  [id number] [facility] [address]  Phone: [phone]  Fax: [phone]    
No

## 2022-09-15 ENCOUNTER — APPOINTMENT (OUTPATIENT)
Dept: FAMILY MEDICINE | Facility: CLINIC | Age: 27
End: 2022-09-15

## 2022-09-15 VITALS
DIASTOLIC BLOOD PRESSURE: 76 MMHG | SYSTOLIC BLOOD PRESSURE: 116 MMHG | WEIGHT: 157 LBS | HEIGHT: 62 IN | BODY MASS INDEX: 28.89 KG/M2 | TEMPERATURE: 97.6 F | HEART RATE: 69 BPM | OXYGEN SATURATION: 97 %

## 2022-09-15 DIAGNOSIS — Z23 ENCOUNTER FOR IMMUNIZATION: ICD-10-CM

## 2022-09-15 DIAGNOSIS — J39.2 OTHER DISEASES OF PHARYNX: ICD-10-CM

## 2022-09-15 DIAGNOSIS — Z11.1 ENCOUNTER FOR SCREENING FOR RESPIRATORY TUBERCULOSIS: ICD-10-CM

## 2022-09-15 PROCEDURE — 90686 IIV4 VACC NO PRSV 0.5 ML IM: CPT

## 2022-09-15 PROCEDURE — 36415 COLL VENOUS BLD VENIPUNCTURE: CPT

## 2022-09-15 PROCEDURE — 99214 OFFICE O/P EST MOD 30 MIN: CPT | Mod: 25

## 2022-09-15 PROCEDURE — G0008: CPT

## 2022-09-15 RX ORDER — TACROLIMUS 0.1% IN PSEUDOCATALASE 0.1 G/100G
0.1 CREAM TOPICAL
Refills: 0 | Status: ACTIVE | COMMUNITY

## 2022-09-15 RX ORDER — FLUTICASONE PROPIONATE 50 UG/1
50 SPRAY, METERED NASAL TWICE DAILY
Qty: 1 | Refills: 1 | Status: ACTIVE | COMMUNITY
Start: 2022-09-15 | End: 1900-01-01

## 2022-09-15 RX ORDER — ERGOCALCIFEROL 1.25 MG/1
1.25 MG CAPSULE, LIQUID FILLED ORAL
Qty: 4 | Refills: 1 | Status: DISCONTINUED | COMMUNITY
Start: 2021-07-22 | End: 2022-09-15

## 2022-09-15 NOTE — PLAN
[FreeTextEntry1] : Needs Quant Gold. \par Will follow up labwork drawn in office today.\par \par Throat Irritation-start Flonase\par Possible reflux component.\par GERD lifestyle modifications discussed. \par \par Received flu vaccine.\par Advised Ms. PARKER RESALLY they may experience some soreness, tenderness at the injection site.  Advised to call office if  not improving.  Ms. RESALLY expressed understanding.\par

## 2022-09-15 NOTE — REVIEW OF SYSTEMS
[Sore Throat] : sore throat [Negative] : Genitourinary [Earache] : no earache [Postnasal Drip] : no postnasal drip [Nausea] : no nausea [Diarrhea] : diarrhea [Vomiting] : no vomiting [Heartburn] : no heartburn [Headache] : no headache [Dizziness] : no dizziness [FreeTextEntry4] : throat burning for 2 weeks [FreeTextEntry7] : indigestion

## 2022-09-15 NOTE — HISTORY OF PRESENT ILLNESS
[FreeTextEntry1] : Here for follow-up; needs TB testing.  Throat irritation.  [de-identified] : Here for follow-up; needs TB testing.  Throat irritation. \par On Prescription Hydrocortisone 2.5% for dermatitis around the mouth, \par \par Just graduating nursing progam. \par Works as an OR tech. On break for school.  Studying for [employer]. \par \par Had LMP 08/31/22.  \par Not taking Vitamin D3 currently. \par \par Throat has been bothering her at night for past 2 weeks. \par Eating close to bedtime.

## 2022-09-19 LAB
M TB IFN-G BLD-IMP: NEGATIVE
QUANTIFERON TB PLUS MITOGEN MINUS NIL: >10 IU/ML
QUANTIFERON TB PLUS NIL: 0.02 IU/ML
QUANTIFERON TB PLUS TB1 MINUS NIL: 0 IU/ML
QUANTIFERON TB PLUS TB2 MINUS NIL: 0 IU/ML

## 2022-11-18 NOTE — ED PROVIDER NOTE - PROGRESS NOTE DETAILS
labs and cxr non-actionable. Pt feels much improved s/p IVF and meds. D/w attending cleared for d/c home. Will send zofran to pharmacy for nausea and advised pt to stop tamiflu. Pt to f/u with PMD. Stable for d/c. - CORNELIO HandyC no

## 2023-01-06 ENCOUNTER — APPOINTMENT (OUTPATIENT)
Dept: FAMILY MEDICINE | Facility: CLINIC | Age: 28
End: 2023-01-06

## 2023-01-09 ENCOUNTER — NON-APPOINTMENT (OUTPATIENT)
Age: 28
End: 2023-01-09

## 2023-03-09 ENCOUNTER — TRANSCRIPTION ENCOUNTER (OUTPATIENT)
Age: 28
End: 2023-03-09

## 2023-09-08 NOTE — ED PROVIDER NOTE - NS ED ATTENDING STATEMENT MOD
Organizing pneumonia I have personally performed a face to face diagnostic evaluation on this patient. I have reviewed the ACP note and agree with the history, exam and plan of care, except as noted.

## 2023-10-31 NOTE — ED ADULT TRIAGE NOTE - CHIEF COMPLAINT QUOTE
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

## 2024-01-11 ENCOUNTER — NON-APPOINTMENT (OUTPATIENT)
Age: 29
End: 2024-01-11

## 2024-02-24 ENCOUNTER — NON-APPOINTMENT (OUTPATIENT)
Age: 29
End: 2024-02-24

## 2024-04-20 ENCOUNTER — NON-APPOINTMENT (OUTPATIENT)
Age: 29
End: 2024-04-20

## 2024-08-30 ENCOUNTER — NON-APPOINTMENT (OUTPATIENT)
Age: 29
End: 2024-08-30

## 2025-04-02 NOTE — ED ADULT NURSE NOTE - CINV DISCH MEDS REVIEWED YN
-- DO NOT REPLY / DO NOT REPLY ALL --  -- This inbox is not monitored. If this was sent to the wrong provider or department, reroute message to P ECO Reroute pool. --  -- Message is from Engagement Center Operations (ECO) --    General Patient Message:    Patient would like to get updates on PA for wegovy. Please contact   Caller Information       Contact Date/Time Type Contact Phone/Fax    04/02/2025 09:34 AM CDT Phone (Incoming) Shanika Jarrell [phone]            Alternative phone number: none     Can a detailed message be left? Yes - Voicemail   Patient has been advised the message will be addressed within 2-3 business days.              Copied from CRM #[id number]. Topic: MW Messaging - MW Patient Request  >> Apr 2, 2025  9:35 AM Sumi MONTALVO wrote:  Shanika Jarrell called requesting to send a general message to clinician.   Verified issue is NOT regarding a symptom(s) requiring routine or emergent triage. Verified another message template type and CRM does not apply.    Selected 'Wrap Up CRM' and created new Telephone Encounter after clicking 'Convert to Clinical Call'. Selected appropriate Reason for Call.  Sent Pt message template and routed as routine priority per Clinician KB page to appropriate clinician pool. Readback full message.   Yes